# Patient Record
Sex: MALE | Race: OTHER | HISPANIC OR LATINO | URBAN - METROPOLITAN AREA
[De-identification: names, ages, dates, MRNs, and addresses within clinical notes are randomized per-mention and may not be internally consistent; named-entity substitution may affect disease eponyms.]

---

## 2018-03-10 ENCOUNTER — EMERGENCY (EMERGENCY)
Facility: HOSPITAL | Age: 38
LOS: 1 days | Discharge: HOME | End: 2018-03-10
Attending: EMERGENCY MEDICINE | Admitting: EMERGENCY MEDICINE
Payer: COMMERCIAL

## 2018-03-10 VITALS
OXYGEN SATURATION: 96 % | HEART RATE: 73 BPM | WEIGHT: 229.94 LBS | SYSTOLIC BLOOD PRESSURE: 130 MMHG | RESPIRATION RATE: 18 BRPM | DIASTOLIC BLOOD PRESSURE: 80 MMHG | TEMPERATURE: 98 F

## 2018-03-10 PROCEDURE — 99283 EMERGENCY DEPT VISIT LOW MDM: CPT | Mod: 25

## 2018-03-10 PROCEDURE — 99283 EMERGENCY DEPT VISIT LOW MDM: CPT

## 2018-03-10 RX ORDER — ACETAMINOPHEN 500 MG
975 TABLET ORAL ONCE
Qty: 0 | Refills: 0 | Status: COMPLETED | OUTPATIENT
Start: 2018-03-10 | End: 2018-03-10

## 2018-03-10 RX ORDER — OXYCODONE HYDROCHLORIDE 5 MG/1
5 TABLET ORAL ONCE
Qty: 0 | Refills: 0 | Status: DISCONTINUED | OUTPATIENT
Start: 2018-03-10 | End: 2018-03-10

## 2018-03-10 RX ADMIN — OXYCODONE HYDROCHLORIDE 5 MILLIGRAM(S): 5 TABLET ORAL at 23:43

## 2018-03-10 RX ADMIN — Medication 975 MILLIGRAM(S): at 23:43

## 2018-03-10 NOTE — ED PROVIDER NOTE - OBJECTIVE STATEMENT
39yo male pt, no PMHx c/o left calf pain s/p injury at 8pm. Pt stated he felt pop during dance. Denies other injuries or impaction. Denies sensory changes or weakness to extremities. Denies headache, neck or back pain. Denies CP/SOB/ABD pain or N/V/D. Denies pelvic or hip pain. Pt took 800mg Motrin prior to arrival. 37yo male pt, no PMHx c/o left calf pain s/p injury at 8pm. Pt stated he felt pop during dance. Denies other injuries or impaction. Denies sensory changes or weakness to extremities. Denies headache, neck or back pain. Denies CP/SOB/ABD pain or N/V/D. Denies pelvic or hip pain. Pt took 800mg Motrin prior to arrival.  **ATTENDING ADDENDUM (Dr. Too Welch): I attest that I have directly examined this patient and elicited a comparable history of present illness and review of systems with my collaborating provider (NP/PA). I attest that I have made significant contributions to the documentation where necessary and as noted in the EMR.

## 2018-03-10 NOTE — ED PROVIDER NOTE - ATTENDING CONTRIBUTION TO CARE
**ATTENDING ADDENDUM (Dr. Too Welch): I attest that I have directly examined this patient and reviewed and formulated the diagnostic and therapeutic management plan in collaboration with the advanced practitioner (NP, PA). Please see MDM note and remainder of EMR for findings from CC, HPI, ROS, and PE. (Charlie)

## 2018-03-10 NOTE — ED PROVIDER NOTE - PROGRESS NOTE DETAILS
crutches with well demo. crutches with well demo.  **ATTENDING ADDENDUM (Dr. Too Welch): agree with above notation by LORENZO Guerra. NO evidence of Achilles tendon rupture. NO evidence of deep venous thrombosis. Likely sprain/strain of gastrocnemius or soleus. NO evidence of fracture or dislocation. Anticipatory guidance provided. Agree with discharge home with close outpatient followup with primary care physician/provider and with medications (if appropriate, if clinically indicated, and as prescribed, as noted in EMR). Recommend orthopedic and physical therapy/occupational therapy referral.

## 2018-03-10 NOTE — ED PROVIDER NOTE - AREA
lower/posterior/distal/**ATTENDING ADDENDUM (Dr. Too Welch): left gastrocnemius area (medial more than lateral)

## 2018-03-10 NOTE — ED PROVIDER NOTE - CHIEF COMPLAINT
The patient is a 38y Male complaining of The patient is a 38y Male complaining of acute left calf pain while dancing (jump)

## 2018-03-10 NOTE — ED PROVIDER NOTE - CONTEXT
known (describe)/sports related/**ATTENDING ADDENDUM (Dr. Too Welch): dancing while at work/twisting/work related

## 2018-03-10 NOTE — ED PROVIDER NOTE - CARE PLAN
Principal Discharge DX:	Gastrocnemius muscle rupture, left, initial encounter Principal Discharge DX:	Gastrocnemius muscle rupture, left, initial encounter  Goal:	ATTENDING ADDENDUM (Dr. Too Welch): Goals of care include resolution of emergent/urgent symptoms and concerns, and restoration to baseline level of homeostasis.  Assessment and plan of treatment:	ATTENDING ADDENDUM (Dr. Too Welch): (1) anticipatory guidance provided  (2) rest  (3) outpatient follow-up with your primary care physician/provider (4) return if symptoms worsen, persist, or do not resolve (5) medications, if indicated, as prescribed

## 2018-03-10 NOTE — ED PROVIDER NOTE - PLAN OF CARE
ATTENDING ADDENDUM (Dr. Too Welch): Goals of care include resolution of emergent/urgent symptoms and concerns, and restoration to baseline level of homeostasis. ATTENDING ADDENDUM (Dr. Too Welch): (1) anticipatory guidance provided  (2) rest  (3) outpatient follow-up with your primary care physician/provider (4) return if symptoms worsen, persist, or do not resolve (5) medications, if indicated, as prescribed

## 2018-03-10 NOTE — ED PROVIDER NOTE - PHYSICAL EXAMINATION
NAD, VSS, Afebrile, No spinal tender, No pelvic or hip tender, + Left calf gastrocnemius tender with mild swelling,  no achilles tender with negative johns test, N/V- intact. NAD, VSS, Afebrile, No spinal tender, No pelvic or hip tender, + Left calf gastrocnemius tender with mild swelling,  no achilles tender with negative johns test, N/V- intact.  **ATTENDING ADDENDUM (Dr. Too Welch): I have reviewed and substantially contributed to the elements of the PE as documented above. I have directly performed an examination of this patient in conjunction with the other members (EM resident/PA/NP) of the patient care team.

## 2018-03-10 NOTE — ED PROVIDER NOTE - AGGRAVATING FACTORS
positional change/twisting/walking/movement/standing/**ATTENDING ADDENDUM (Dr. Too Welch): **ATTENDING ADDENDUM (Dr. Too Welch): unable to gait and weight bear secondary to pain

## 2018-03-10 NOTE — ED PROVIDER NOTE - LOCATION
leg **ATTENDING ADDENDUM (Dr. Too Welch): left calf with pain with palpation and with soft-tissue swelling. POSITIVE intact Achilles tendon mechanism noted. NO distal discoloration. NO warmth. NO erythema. NO ecchymosis./leg

## 2018-03-10 NOTE — ED PROVIDER NOTE - CONDUCTED A DETAILED DISCUSSION WITH PATIENT AND/OR GUARDIAN REGARDING, MDM
return to ED if symptoms worsen, persist or questions arise/need for outpatient follow-up return to ED if symptoms worsen, persist or questions arise/**ATTENDING ADDENDUM (Dr. Too Welch): Anticipatory guidance provided./need for outpatient follow-up

## 2018-03-11 RX ORDER — OXYCODONE HYDROCHLORIDE 5 MG/1
1 TABLET ORAL
Qty: 12 | Refills: 0 | OUTPATIENT
Start: 2018-03-11 | End: 2018-03-13

## 2023-03-20 NOTE — ED ADULT NURSE NOTE - OBJECTIVE STATEMENT
"Pending Prescriptions:                       Disp   Refills    albuterol (PROAIR HFA/PROVENTIL HFA/VENTOL*18 g   3        Sig: Inhale 2 puffs into the lungs every 6 hours as needed           for shortness of breath or wheezing    Routing refill request to provider for review/approval because:  A break in medication    Requested Prescriptions   Pending Prescriptions Disp Refills    albuterol (PROAIR HFA/PROVENTIL HFA/VENTOLIN HFA) 108 (90 Base) MCG/ACT inhaler 18 g 3     Sig: Inhale 2 puffs into the lungs every 6 hours as needed for shortness of breath or wheezing       Asthma Maintenance Inhalers - Anticholinergics Passed - 3/20/2023  3:57 PM        Passed - Patient is age 12 years or older        Passed - Asthma control assessment score within normal limits in last 6 months     Please review ACT score.           Passed - Medication is active on med list        Passed - Recent (6 mo) or future (30 days) visit within the authorizing provider's specialty     Patient had office visit in the last 6 months or has a visit in the next 30 days with authorizing provider or within the authorizing provider's specialty.  See \"Patient Info\" tab in inbasket, or \"Choose Columns\" in Meds & Orders section of the refill encounter.           Short-Acting Beta Agonist Inhalers Protocol  Passed - 3/20/2023  3:57 PM        Passed - Patient is age 12 or older        Passed - Asthma control assessment score within normal limits in last 6 months     Please review ACT score.           Passed - Medication is active on med list        Passed - Recent (6 mo) or future (30 days) visit within the authorizing provider's specialty     Patient had office visit in the last 6 months or has a visit in the next 30 days with authorizing provider or within the authorizing provider's specialty.  See \"Patient Info\" tab in inbasket, or \"Choose Columns\" in Meds & Orders section of the refill encounter.                 "
pt is a 38 yr M who while dancing felt a "pop" in his R calf. did not fall. denies numbness/tingling. no foot or knee inj. +tenderness.

## 2024-04-16 NOTE — ED ADULT NURSE NOTE - NS PRO PASSIVE SMOKE EXP
-- DO NOT REPLY / DO NOT REPLY ALL --  -- Message is from [facility] ([facility]) --    Communicated to patient via [employer]    Medication:fluticasone-salmeterol (Advair Diskus) 500-50 MCG/ACT inhaler   passed protocol.   Last office visit date: 11.15.23  Next appointment scheduled?: No; F/U AS NEEDED PER LAST VISIT  Number of refills given: 2                 No

## 2024-07-01 NOTE — ED PROVIDER NOTE - MEDICAL DECISION MAKING DETAILS
Pt has been vomiting for about a month and has seen providers which stated she has a viral bug. Pt lost weight and feels weak. Pt still has strong episodes of vomiting and dry heaves. Denies abdominal pain or fevers. Pt has slimy (bugger like) stool and water. Denies taking abx. Pt works at assisted living and c-diff was noted a couple months ago, but not recent. Denies travels.    **ATTENDING MEDICAL DECISION MAKING/SYNTHESIS (Dr. Too Welch): I have reviewed the Chief Complaint, the HPI, the ROS, and have directly performed and confirmed the findings on the Physical Examination. I have reviewed the medical decision making with all providers, as applicable. The PROBLEM REPRESENTATION at this time is: 38-year-old man with left calf pain, tenderness and soft-tissue swelling s/p "popping" sensation experienced while dancing. The MOST LIKELY DIAGNOSIS, and the LIST OF DIFFERENTIAL DIAGNOSES, includes (but is not limited to) the following: gastrocnemius sprain/strain (high-grade v. low-grade tear), soleus tear (high-grade v. low-grade tear), Achilles tendon rupture (NO evidence), fracture of lower leg/ankle/foot (NO evidence), dislocation of lower leg/ankle/foot (NO evidence), vascular injury (NO evidence). The likelihood of each of these diagnoses has been appropriately considered in the context of this patient's presentation and my evaluation. PLAN: as described in EMR, including diagnostics, therapeutics and consultation as clinically warranted. I will continue to reevaluate the patient, including the results of all testing, and monitor response to therapy throughout the patient's course in the ED.